# Patient Record
Sex: MALE
[De-identification: names, ages, dates, MRNs, and addresses within clinical notes are randomized per-mention and may not be internally consistent; named-entity substitution may affect disease eponyms.]

---
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
## 2023-05-09 DIAGNOSIS — M47.816 SPONDYLOSIS W/OUT MYELOPATHY OR RADICULOPATHY, LUMBAR REGION: ICD-10-CM

## 2023-05-12 ENCOUNTER — APPOINTMENT (OUTPATIENT)
Dept: ORTHOPEDIC SURGERY | Facility: CLINIC | Age: 23
End: 2023-05-12
Payer: MEDICAID

## 2023-05-12 VITALS
WEIGHT: 210 LBS | HEART RATE: 76 BPM | BODY MASS INDEX: 30.06 KG/M2 | TEMPERATURE: 98.1 F | SYSTOLIC BLOOD PRESSURE: 145 MMHG | DIASTOLIC BLOOD PRESSURE: 87 MMHG | HEIGHT: 70 IN

## 2023-05-12 DIAGNOSIS — M51.16 INTERVERTEBRAL DISC DISORDERS WITH RADICULOPATHY, LUMBAR REGION: ICD-10-CM

## 2023-05-12 PROCEDURE — 72100 X-RAY EXAM L-S SPINE 2/3 VWS: CPT

## 2023-05-12 PROCEDURE — 99204 OFFICE O/P NEW MOD 45 MIN: CPT

## 2023-05-12 RX ORDER — METHYLPREDNISOLONE 4 MG/1
4 TABLET ORAL
Qty: 1 | Refills: 1 | Status: ACTIVE | COMMUNITY
Start: 2023-05-12 | End: 1900-01-01

## 2023-05-13 NOTE — DISCUSSION/SUMMARY
[Medication Risks Reviewed] : Medication risks reviewed [Surgical risks reviewed] : Surgical risks reviewed [4 Weeks] : in 4 weeks [de-identified] : 45 minutes was spent reviewing the x-rays as well as discussing with the patient their clinical presentation, diagnosis and providing education.  Conservative treatment was discussed with the patient at length. Anticipatory guidance regarding disease process lumbar disc herniation, avoidance of acute exacerbation this was discussed at length and all patients commenting concerns were answered to the patient's satisfaction. Physical therapy for decrease pain and increase function was ordered. Patient was given home exercises as approved by [employer] and works well held directed toward this particular process. Intermittent use of acetaminophen 500 mg 2 tablets t.i.d. p.r.n. mild to moderate pain, ibuprofen 600 mg t.i.d. p.r.n. severe pain with food or milk after Medrol Dosepak has been completed x2 for anti-inflammatory properties. Home exercise including stretching on a daily basis for 20-30 minutes was recommended. Heat, ice, topical were discussed as needed. The patient will followup in 4 weeks at which point in time if symptoms are no better or will follow-up as needed exacerbation of symptoms at which time we will rediscuss possible discectomy surgery for left lower extremity radiculitis and mild weakness of the left lower extremity.  I did encourage him to think about discectomy surgery being that his left lower extremity is slightly weak however he has had this symptomatology for over 1 year and may have chronic neurologic sequelae even with surgical intervention.

## 2023-05-13 NOTE — HISTORY OF PRESENT ILLNESS
[de-identified] : 22-year-old male complains of low back pain but mom first and foremost left lower extremity pain tingling numbness burning which started approximately 1 year ago and continues.  He states the pain is worse when he is quiet when he is laying in bed better when he is active.  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  He is able to accomplish all his activities of daily living has no changes in bowel or bladder.  He has not had any physical therapy chiropractic or pain management.  His primary care physician has been treating him for this issue for the last 6 months, he is taking naproxen and cyclobenzaprine as needed which do not help particularly to relieve his symptoms.  Pain at worst is rating 7 out of 10 and at best is 2 out of 10.  Past medical surgical social family allergy history was reviewed.

## 2023-05-13 NOTE — PHYSICAL EXAM
[de-identified] : CONSTITUTIONAL: The patient is a very pleasant individual who is well-nourished and who appears stated age.\par PSYCHIATRIC: The patient is alert and oriented X 3 and in no apparent distress, and participates with orthopedic evaluation well.\par HEAD: Atraumatic and is nonsyndromic in appearance.\par EENT: No visible thyromegaly, EOMI.\par RESPIRATORY: Respiratory rate is regular, not dyspneic on examination.\par LYMPHATICS: There is no inguinal lymphadenopathy\par INTEGUMENTARY: Skin is clean, dry, and intact about the bilateral lower extremities and lumbar spine.\par VASCULAR: There is brisk capillary refill about the bilateral lower extremities.\par NEUROLOGIC: There are no pathologic reflexes. There is no decrease in sensation of the right lower extremity on Wartenberg pinwheel examination/manually, there is decrease in sensation in L4 distribution on the left manually. Deep tendon reflexes are well maintained at 2+/4 of the bilateral lower extremities and are symmetric..\par MUSCULOSKELETAL: There is no visible muscular atrophy. Manual motor strength is well maintained in the right lower extremity, left lower extremity ankle eversion hip flexion are apprehensive, 4 out of 5 otherwise 5 out of 5.. Range of motion of lumbar spine is well maintained. The patient ambulates in a non-myelopathic manner.  Positive left-sided tension sign and straight leg raise .  Normal secondary orthopaedic exam of bilateral hips, greater trochanteric area, knees and ankles [de-identified] : X-ray done today [facility] lateral lumbar spine on the date of May 12, 2023 demonstrates focal kyphosis L4-L5, decreased intervertebral disc space L5-S1.\par \par Lumbar MRI done at [facility] May 2023 demonstrates a large disc herniation L4-L5 paracentral to the left concordant with his current symptoms